# Patient Record
Sex: FEMALE | Race: BLACK OR AFRICAN AMERICAN
[De-identification: names, ages, dates, MRNs, and addresses within clinical notes are randomized per-mention and may not be internally consistent; named-entity substitution may affect disease eponyms.]

---

## 2023-11-08 ENCOUNTER — HOSPITAL ENCOUNTER (OUTPATIENT)
Facility: HOSPITAL | Age: 73
Setting detail: SPECIMEN
Discharge: HOME | End: 2023-11-11
Payer: MEDICARE

## 2023-11-08 PROCEDURE — 88305 TISSUE EXAM BY PATHOLOGIST: CPT

## 2025-04-09 ENCOUNTER — OFFICE VISIT (OUTPATIENT)
Age: 75
End: 2025-04-09
Payer: MEDICARE

## 2025-04-09 VITALS
HEIGHT: 62 IN | OXYGEN SATURATION: 99 % | HEART RATE: 89 BPM | TEMPERATURE: 97.3 F | SYSTOLIC BLOOD PRESSURE: 122 MMHG | DIASTOLIC BLOOD PRESSURE: 82 MMHG

## 2025-04-09 DIAGNOSIS — R94.39 ABNORMAL NUCLEAR STRESS TEST: ICD-10-CM

## 2025-04-09 DIAGNOSIS — I51.89 DIASTOLIC DYSFUNCTION: ICD-10-CM

## 2025-04-09 DIAGNOSIS — G47.33 OBSTRUCTIVE SLEEP APNEA: ICD-10-CM

## 2025-04-09 DIAGNOSIS — G44.52 NEW DAILY PERSISTENT HEADACHE: ICD-10-CM

## 2025-04-09 DIAGNOSIS — R94.31 ABNORMAL ELECTROCARDIOGRAPHY: ICD-10-CM

## 2025-04-09 DIAGNOSIS — I51.7 LVH (LEFT VENTRICULAR HYPERTROPHY): ICD-10-CM

## 2025-04-09 DIAGNOSIS — R06.02 EXERTIONAL SHORTNESS OF BREATH: Primary | ICD-10-CM

## 2025-04-09 DIAGNOSIS — R01.1 SYSTOLIC MURMUR: ICD-10-CM

## 2025-04-09 DIAGNOSIS — I10 PRIMARY HYPERTENSION: ICD-10-CM

## 2025-04-09 DIAGNOSIS — R94.31 ABNORMAL ECG: ICD-10-CM

## 2025-04-09 PROCEDURE — 99205 OFFICE O/P NEW HI 60 MIN: CPT | Performed by: INTERNAL MEDICINE

## 2025-04-09 PROCEDURE — 1159F MED LIST DOCD IN RCRD: CPT | Performed by: INTERNAL MEDICINE

## 2025-04-09 PROCEDURE — 3079F DIAST BP 80-89 MM HG: CPT | Performed by: INTERNAL MEDICINE

## 2025-04-09 PROCEDURE — 1126F AMNT PAIN NOTED NONE PRSNT: CPT | Performed by: INTERNAL MEDICINE

## 2025-04-09 PROCEDURE — 3074F SYST BP LT 130 MM HG: CPT | Performed by: INTERNAL MEDICINE

## 2025-04-09 PROCEDURE — 93000 ELECTROCARDIOGRAM COMPLETE: CPT | Performed by: INTERNAL MEDICINE

## 2025-04-09 PROCEDURE — 1123F ACP DISCUSS/DSCN MKR DOCD: CPT | Performed by: INTERNAL MEDICINE

## 2025-04-09 RX ORDER — ASPIRIN 81 MG/1
81 TABLET, CHEWABLE ORAL DAILY
COMMUNITY

## 2025-04-09 RX ORDER — SPIRONOLACTONE 50 MG/1
50 TABLET, FILM COATED ORAL DAILY
COMMUNITY

## 2025-04-09 RX ORDER — ERGOCALCIFEROL 1.25 MG/1
50000 CAPSULE, LIQUID FILLED ORAL WEEKLY
COMMUNITY
Start: 2025-04-06

## 2025-04-09 RX ORDER — TOPIRAMATE 50 MG/1
1 TABLET, FILM COATED ORAL
COMMUNITY

## 2025-04-09 RX ORDER — FUROSEMIDE 20 MG/1
20 TABLET ORAL DAILY
COMMUNITY

## 2025-04-09 RX ORDER — VENLAFAXINE HYDROCHLORIDE 75 MG/1
75 CAPSULE, EXTENDED RELEASE ORAL DAILY
COMMUNITY

## 2025-04-09 ASSESSMENT — PATIENT HEALTH QUESTIONNAIRE - PHQ9
SUM OF ALL RESPONSES TO PHQ QUESTIONS 1-9: 0
2. FEELING DOWN, DEPRESSED OR HOPELESS: NOT AT ALL
1. LITTLE INTEREST OR PLEASURE IN DOING THINGS: NOT AT ALL
SUM OF ALL RESPONSES TO PHQ QUESTIONS 1-9: 0

## 2025-04-09 ASSESSMENT — ENCOUNTER SYMPTOMS
SHORTNESS OF BREATH: 1
ALLERGIC/IMMUNOLOGIC NEGATIVE: 1
EYES NEGATIVE: 1
GASTROINTESTINAL NEGATIVE: 1

## 2025-04-09 NOTE — PROGRESS NOTES
Antonia Box (:  1950) is a 74 y.o. female,New patient, here for evaluation of the following chief complaint(s):  New Patient (Referred By Judith HARRIS)      Subjective   SUBJECTIVE/OBJECTIVE:    History of Present Illness  The patient presents for evaluation of headaches, shortness of breath, hypertension, and prediabetes.    Severe headaches localized to the left posterior region of the head have been experienced for approximately one month. Despite undergoing a series of tests including a lumbar puncture, CT scan, and CTA scan, all results were normal. The etiology of the headaches remains undetermined. Nurtec, aspirin, and Topamax were prescribed by her neurologist, which successfully alleviated the headaches. However, subsequent dizziness, imbalance, and a persistent fluttering sensation in the right ear developed. An appointment with an otolaryngologist is currently awaited.    A myocardial infarction was suggested based on EKG findings. No chest discomfort or tightness is reported, but significant dyspnea is experienced, even at rest. Approximately 1.5 weeks ago, breathlessness occurred while ascending stairs. A diagnosis of sleep apnea was made last year, but no CPAP machine has been prescribed. Occasional snoring when sleeping on her back and abnormal breathing patterns during sleep are noted.    Hypertension is present, with occasional home monitoring of blood pressure reported as normal. Medications include spironolactone and a diuretic.    Prediabetes is also noted.    FAMILY HISTORY  - Father: Passed away from a heart attack    History reviewed. No pertinent past medical history.     Past Surgical History:   Procedure Laterality Date    ANKLE SURGERY Bilateral     HYSTERECTOMY (CERVIX STATUS UNKNOWN)          Allergies   Allergen Reactions    Propionic Acid         Current Outpatient Medications   Medication Sig Dispense Refill    venlafaxine (EFFEXOR XR) 75 MG extended release

## 2025-04-09 NOTE — PROGRESS NOTES
1. \"Have you been to the ER, urgent care clinic since your last visit?  Hospitalized since your last visit?\" Reviewed by Dr. Arya Keith     2. \"Have you seen or consulted any other health care providers outside of the [facility] since your last visit?\" Reviewed by Dr. Arya Keith

## 2025-05-02 ENCOUNTER — TELEPHONE (OUTPATIENT)
Age: 75
End: 2025-05-02

## 2025-05-15 ENCOUNTER — TELEPHONE (OUTPATIENT)
Age: 75
End: 2025-05-15

## 2025-05-15 NOTE — TELEPHONE ENCOUNTER
Patient called and she was identified by full name and . She also had her family in the background listening.   I did call the patient back and did review what I could on the test, but the patient is requesting that Dr. Keith call her personally. She would like the results of her ST and Echo.

## 2025-05-28 NOTE — TELEPHONE ENCOUNTER
Patient called and left a message on the nurse line stating that she left a previous message requesting a call back from Dr. Keith. And she has not received any calls.

## 2025-06-05 NOTE — TELEPHONE ENCOUNTER
ECHO 5/1/25 -     Image quality is fair.    Left Ventricle: Normal left ventricular systolic function. EF by visual approximation is 60%. Left ventricle size is normal. Normal wall thickness. Normal wall motion. Normal diastolic function. Normal left ventricular filling pressure.    Right Ventricle: Right ventricle size is normal. Normal systolic function.    Tricuspid Valve: Unable to assess RVSP due to inadequate or insignificant tricuspid regurgitation.    NST 5/5/25 -     Stress Combined Conclusion: Normal pharmacological myocardial perfusion study. Findings suggest a low risk of cardiac events.    Stress Function: Left ventricular function post-stress is normal. Post-stress ejection fraction is 88%. The stress end diastolic cavity size is normal. The stress end systolic cavity size is normal.    Perfusion Comments: Prone images were obtained. LV perfusion is normal. There is no evidence of inducible ischemia.    ECG: Resting ECG demonstrates normal sinus rhythm.    Stress Test: A pharmacological stress test was performed using regadenoson (Lexiscan). Low level exercise was used during the pharmacological stress test. Hemodynamics are suboptimal due to medication. Blood pressure demonstrated a normal response and heart rate demonstrated a normal response to stress. The patient's heart rate recovery was normal.    Called patient as requested regarding above testing.  I went over echo and stress test results with the patient.  Discussed with her that her results did not show any significant abnormalities.  She asks if she should make her appointment sooner with Dr. Keith based on these results and I discussed with her that her results were very good and that she did not need to make a sooner appointment based on them.  However, if a problem arises later, she can let us know and we can get her in sooner.  She reports understanding.  I answered all questions.    Indiana Moreland PA-C  4:11 PM